# Patient Record
Sex: MALE | URBAN - METROPOLITAN AREA
[De-identification: names, ages, dates, MRNs, and addresses within clinical notes are randomized per-mention and may not be internally consistent; named-entity substitution may affect disease eponyms.]

---

## 2019-07-03 ENCOUNTER — OFFICE VISIT (OUTPATIENT)
Dept: URBAN - METROPOLITAN AREA CLINIC 29 | Facility: CLINIC | Age: 84
End: 2019-07-03
Payer: COMMERCIAL

## 2019-07-03 DIAGNOSIS — H25.11 AGE-RELATED NUCLEAR CATARACT, RIGHT EYE: ICD-10-CM

## 2019-07-03 DIAGNOSIS — E11.9 TYPE 2 DIABETES MELLITUS W/O COMPLICATION: Primary | ICD-10-CM

## 2019-07-03 PROCEDURE — 92014 COMPRE OPH EXAM EST PT 1/>: CPT | Performed by: OPTOMETRIST

## 2019-07-03 RX ORDER — ZINC GLUCONATE 10 MG
250 MG LOZENGE ORAL
Qty: 0 | Refills: 0 | Status: INACTIVE
Start: 2019-07-03 | End: 2019-07-03

## 2019-07-03 ASSESSMENT — INTRAOCULAR PRESSURE
OS: 14
OD: 14

## 2019-07-03 NOTE — IMPRESSION/PLAN
Impression: Type 2 diabetes mellitus w/o complication: T25.8. No Diabetic related eye Disease OU Plan: Discussed diagnosis in detail with patient. Emphasized blood sugar control. Will continue to observe condition and or symptoms. Call if symptoms occur.

## 2019-07-03 NOTE — IMPRESSION/PLAN
Impression: Age-related nuclear cataract, right eye: H25.11 OD. Plan: Discussed diagnosis in detail with patient. Discussed treatment options with patient. Consult recommended [Cataract Specialist]. Discussed IOL lens options.